# Patient Record
Sex: FEMALE | Race: WHITE | Employment: FULL TIME | URBAN - METROPOLITAN AREA
[De-identification: names, ages, dates, MRNs, and addresses within clinical notes are randomized per-mention and may not be internally consistent; named-entity substitution may affect disease eponyms.]

---

## 2024-08-12 DIAGNOSIS — M25.561 RIGHT KNEE PAIN, UNSPECIFIED CHRONICITY: Primary | ICD-10-CM

## 2024-08-12 NOTE — PROGRESS NOTES
[facility] ORTHOPEDICS AND SPORTS MEDICINE  [address]  [place] [address]  Dept: [phone]  Dept Fax: [phone]          Right knee -[facility]-new patient    Subjective:     Chief Complaint   Patient presents with    Knee Pain     R Knee Pain     HPI:     [facility] claim number: [id number]  Date of injury: 5/8/2024  Diagnosis: S86.911A--knee sprain right                    S76.311A-right hamstring strain                    S83.241A-other tear of the medial meniscus, right knee    Mihaela Orozco presents today for right knee pain. The pain has been present since 5/8/2024.patient had a work-related injury.  She states she was lifting something heavy and had knee pain. She had a sharp pain and then could not put weight on it.  The patient has tried ice, rest, brace, PT, naprosyn with mild improvement. The pain is now described as Achy, Sharp, and Dull. There is  pain on weight bearing. The knee has swelled. There is not painful popping and clicking. The knee has not caught or locked up. The knee has not given out. It is  stiff upon arising from sitting. It is  painful to go up and down stairs and sit for a prolonged time. The patient has not had a cortisone injection. The patient has not tried a lubrication injection. The patient has tried physical therapy for one week. The patient has not had surgery.  The patient has had an MRI.  Patient is working with restrictions of no squatting, no ladders and mostly has a sedentary job.  She has not been going on any of the set ups.  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  She does have a job that is often sedentary but she does have to do set ups and travel happens.  When they are doing sit ups it is a much more physical job.    ROS:   Review of Systems   Constitutional:  Positive for activity change. Negative for appetite change, fatigue and fever.   Respiratory: Negative.  Negative for apnea,

## 2024-08-14 ENCOUNTER — OFFICE VISIT (OUTPATIENT)
Dept: ORTHOPEDIC SURGERY | Age: 44
End: 2024-08-14

## 2024-08-14 VITALS — RESPIRATION RATE: 20 BRPM | WEIGHT: 220 LBS | BODY MASS INDEX: 36.65 KG/M2 | HEIGHT: 65 IN

## 2024-08-14 DIAGNOSIS — S83.241A ACUTE MEDIAL MENISCUS TEAR OF RIGHT KNEE, INITIAL ENCOUNTER: Primary | ICD-10-CM

## 2024-08-14 DIAGNOSIS — M17.11 PRIMARY OSTEOARTHRITIS OF RIGHT KNEE: ICD-10-CM

## 2024-08-14 ASSESSMENT — ENCOUNTER SYMPTOMS
CHEST TIGHTNESS: 0
VOMITING: 0
CONSTIPATION: 0
NAUSEA: 0
COLOR CHANGE: 0
COUGH: 0
APNEA: 0
GASTROINTESTINAL NEGATIVE: 1
SHORTNESS OF BREATH: 0
ABDOMINAL DISTENTION: 0
DIARRHEA: 0
RESPIRATORY NEGATIVE: 1
ABDOMINAL PAIN: 0

## 2024-08-19 ENCOUNTER — TELEPHONE (OUTPATIENT)
Dept: ORTHOPEDIC SURGERY | Age: 44
End: 2024-08-19

## 2024-08-19 NOTE — TELEPHONE ENCOUNTER
Left VM for patient to call back. Received approval for a R Knee Inj (scanned in to media). 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     Please express that I have not received approval for her PT yet. I did call on it to see if it was just an error, but the approval for the injection did come through so we can proceed with that. I will call her when I get the approval for PT.

## 2024-08-20 NOTE — TELEPHONE ENCOUNTER
Spoke with patient. Informed her that I received the approval for PT. Stated she can get that scheduled and start ASAP.     This was scanned in to media.

## 2024-08-26 ENCOUNTER — OFFICE VISIT (OUTPATIENT)
Dept: ORTHOPEDIC SURGERY | Age: 44
End: 2024-08-26

## 2024-08-26 VITALS — RESPIRATION RATE: 18 BRPM | OXYGEN SATURATION: 97 % | BODY MASS INDEX: 36.49 KG/M2 | HEIGHT: 65 IN | WEIGHT: 219 LBS

## 2024-08-26 DIAGNOSIS — M17.11 PRIMARY OSTEOARTHRITIS OF RIGHT KNEE: ICD-10-CM

## 2024-08-26 DIAGNOSIS — S83.241A ACUTE MEDIAL MENISCUS TEAR OF RIGHT KNEE, INITIAL ENCOUNTER: Primary | ICD-10-CM

## 2024-08-26 DIAGNOSIS — S86.911D STRAIN OF RIGHT KNEE AND LEG, SUBSEQUENT ENCOUNTER: ICD-10-CM

## 2024-08-26 RX ORDER — AZELAIC ACID 0.15 G/G
GEL TOPICAL
COMMUNITY
Start: 2024-03-11

## 2024-08-26 NOTE — PROGRESS NOTES
[facility] ORTHOPEDICS AND SPORTS MEDICINE  [address]  [place] [address]  Dept: [phone]  Dept Fax: [phone]          Right knee Visit - Follow up    Subjective:     Chief Complaint   Patient presents with    Knee Pain     Right knee pain- Cortisone- DOI 5/8/24     HPI:     [facility] claim number: [id number]  Date of injury: 5/8/2024  Diagnosis: S86.911A--knee sprain right                    S76.311A-right hamstring strain                    S83.241A-other tear of the medial meniscus, right knee    Mihaela Orozco presents today for Right knee pain.  Patient is here today for cortisone injections into Right knee.  She has not had a cortisone in the past.  She is working with no squatting, no ladders but mostly has a sedentary job.     I have reviewed the CC, and if not present in this note, I have reviewed in the patient's chart.   I agree with the documentation provided by other staff and have reviewed their documentation prior to providing my signature indicating agreement.  Vitals:   Resp 18   Ht 1.651 m (5' 5\")   Wt 99.3 kg (219 lb)   SpO2 97%   BMI 36.44 kg/m²  Body mass index is 36.44 kg/m².    Assessment:     1. Acute medial meniscus tear of right knee, initial encounter    2. Primary osteoarthritis of right knee    3. Strain of right knee and leg, subsequent encounter          Procedure:   Procedure: Yes    Regular Knee Injection    Location: Right Knee  Procedure: I discussed in detail the risks, benefits and complications of the corticosteroid injection which included but are not limited to: infection, skin reactions, hot swollen, and anaphylaxis with the patient. Mihaela Orozco verbalized understanding and they have agreed to have the corticosteroid injection into the right knee. The patient was placed in the Supine position on the exam table. The superior lateral portal was identified and marked with a ball point

## 2024-09-05 ENCOUNTER — HOSPITAL ENCOUNTER (OUTPATIENT)
Dept: PHYSICAL THERAPY | Facility: CLINIC | Age: 44
Setting detail: THERAPIES SERIES
Discharge: HOME | End: 2024-09-05
Payer: COMMERCIAL

## 2024-09-05 PROCEDURE — 97110 THERAPEUTIC EXERCISES: CPT

## 2024-09-05 PROCEDURE — 97161 PT EVAL LOW COMPLEX 20 MIN: CPT

## 2024-09-05 NOTE — CONSULTS
Treatment Plan:    [x] Therapeutic Exercise   56419  [] Iontophoresis: 4 mg/mL Dexamethasone Sodium Phosphate  mAmin  46103   [x] Manual Therapy  66632 [] Aquatic Therapy   68783    [x] Gait Training   33002 [] Ultrasound   64297   [x] Neuromuscular Re-education  61577 [] Electrical Stimulation Unattended  40502   []  Therapeutic Activity  89768 [] Electrical Stimulation Attended  76042   [x] Instruction in HEP  [] Lumbar/Cervical Traction  46775   [x] Vasopneumatic cold with compression  85944  [] Cold/hotpack        Frequency:  2-3 x/week for 20 visits      Today’s Treatment:    Precautions: Standard     Exercise    RLE Meniscus tear, OA knee  Reps/ Time Weight/ Level Comments         Bike             Hamstring stretch   HEP   Calf stretch   HEP   Bridges   HEP   SLR   HEP   Sidelying hip Abd   HEP   Clamshells   HEP         4 way hip band      TKE band      Total Gym Squat      Total Gym heel raise      Step up      Tap down      Balance board                                             Specific Instructions for next treatment: advance HEP     Evaluation Complexity:  History (Personal factors, comorbidities) [x] 0 [] 1-2 [] 3+   Exam (limitations, restrictions) [x] 1-2 [] 3 [] 4+   Clinical presentation (progression) [x] Stable [] Evolving  [] Unstable   Decision Making [x] Low [] Moderate [] High    [x] Low Complexity [] Moderate Complexity [] High Complexity         Treatment Charges:   Mins Units Time   Evaluation       [x]  Low       []  Moderate       []  High   30   1  1086-8097   Ther Exercise   10  1  3486-6622   Manual Therapy          Vasocompression          Neuro Re-ed          Ther Activity           Gait                    Total Treatment time   40   2 5784-1589         TOTAL TREATMENT TIME: 40    Time in:1300   Time Out:1340      Electronically signed by: Alex Luther PT        Physician Signature:________________________________Date:__________________  By signing above or cosigning

## 2024-09-09 ENCOUNTER — HOSPITAL ENCOUNTER (OUTPATIENT)
Dept: PHYSICAL THERAPY | Facility: CLINIC | Age: 44
Setting detail: THERAPIES SERIES
Discharge: HOME | End: 2024-09-09
Payer: COMMERCIAL

## 2024-09-09 PROCEDURE — 97110 THERAPEUTIC EXERCISES: CPT

## 2024-09-13 ENCOUNTER — HOSPITAL ENCOUNTER (OUTPATIENT)
Dept: PHYSICAL THERAPY | Facility: CLINIC | Age: 44
Setting detail: THERAPIES SERIES
Discharge: HOME | End: 2024-09-13
Payer: COMMERCIAL

## 2024-09-13 PROCEDURE — 97110 THERAPEUTIC EXERCISES: CPT

## 2024-09-16 ENCOUNTER — HOSPITAL ENCOUNTER (OUTPATIENT)
Dept: PHYSICAL THERAPY | Facility: CLINIC | Age: 44
Setting detail: THERAPIES SERIES
Discharge: HOME | End: 2024-09-16
Payer: COMMERCIAL

## 2024-09-16 PROCEDURE — 97110 THERAPEUTIC EXERCISES: CPT

## 2024-09-20 ENCOUNTER — HOSPITAL ENCOUNTER (OUTPATIENT)
Dept: PHYSICAL THERAPY | Facility: CLINIC | Age: 44
Setting detail: THERAPIES SERIES
Discharge: HOME | End: 2024-09-20
Payer: COMMERCIAL

## 2024-09-20 PROCEDURE — 97110 THERAPEUTIC EXERCISES: CPT

## 2024-09-23 ENCOUNTER — APPOINTMENT (OUTPATIENT)
Dept: PHYSICAL THERAPY | Facility: CLINIC | Age: 44
End: 2024-09-23
Payer: COMMERCIAL

## 2024-09-27 ENCOUNTER — HOSPITAL ENCOUNTER (OUTPATIENT)
Dept: PHYSICAL THERAPY | Facility: CLINIC | Age: 44
Setting detail: THERAPIES SERIES
Discharge: HOME | End: 2024-09-27
Payer: COMMERCIAL

## 2024-09-27 PROCEDURE — 97110 THERAPEUTIC EXERCISES: CPT

## 2024-10-02 NOTE — PROGRESS NOTES
[facility] ORTHOPEDICS AND SPORTS MEDICINE  [address]  [place] [address]  Dept: [phone]  Dept Fax: [phone]        Ambulatory Follow Up      Subjective:   Mihaela Orozco is a 44 y.o. year old female who presents to our office today for routine followup regarding her   1. Acute medial meniscus tear of right knee, initial encounter    2. Primary osteoarthritis of right knee    .    Chief Complaint   Patient presents with    Knee Pain     [facility]-Right Knee Pain  Date of injury: 5/8/2024       [facility] claim number: [id number]  Date of injury: 5/8/2024  Diagnosis: S86.911A--knee sprain right                    S76.311A-right hamstring strain                    S83.241A-other tear of the medial meniscus, right knee    HPI Mihaela Orozco  is a 44 y.o.  female who presents today in follow for right knee pain.  The patient was last seen on 8/26/2024 and underwent treatment in the form of cortisone injection into the right knee and going to physical therapy.   Patient has been attending physical therapy.  Patient is working with no squatting, no ladders but mostly has a sedentary job.  Will get approval for a medial  brace.  The patient notes 80% improvement with the previous treatment.   She is able to do everything including stairs and squatting now.     Review of Systems   Constitutional:  Positive for activity change. Negative for appetite change, fatigue and fever.   Respiratory: Negative.  Negative for apnea, cough, chest tightness and shortness of breath.    Cardiovascular: Negative.  Negative for chest pain, palpitations and leg swelling.   Gastrointestinal: Negative.  Negative for abdominal distention, abdominal pain, constipation, diarrhea, nausea and vomiting.   Genitourinary: Negative.  Negative for difficulty urinating, dysuria and hematuria.   Musculoskeletal:  Positive for arthralgias. Negative for gait problem, joint

## 2024-10-03 ENCOUNTER — OFFICE VISIT (OUTPATIENT)
Dept: ORTHOPEDIC SURGERY | Age: 44
End: 2024-10-03
Payer: COMMERCIAL

## 2024-10-03 VITALS — RESPIRATION RATE: 16 BRPM | HEIGHT: 65 IN | OXYGEN SATURATION: 98 % | WEIGHT: 240 LBS | BODY MASS INDEX: 39.99 KG/M2

## 2024-10-03 DIAGNOSIS — S83.241A ACUTE MEDIAL MENISCUS TEAR OF RIGHT KNEE, INITIAL ENCOUNTER: Primary | ICD-10-CM

## 2024-10-03 DIAGNOSIS — M17.11 PRIMARY OSTEOARTHRITIS OF RIGHT KNEE: ICD-10-CM

## 2024-10-03 PROCEDURE — 99213 OFFICE O/P EST LOW 20 MIN: CPT | Performed by: PHYSICIAN ASSISTANT

## 2024-10-03 RX ORDER — DESONIDE 0.5 MG/ML
LOTION TOPICAL
COMMUNITY
Start: 2024-07-27

## 2024-10-03 ASSESSMENT — ENCOUNTER SYMPTOMS
ABDOMINAL PAIN: 0
SHORTNESS OF BREATH: 0
VOMITING: 0
NAUSEA: 0
COLOR CHANGE: 0
ABDOMINAL DISTENTION: 0
CHEST TIGHTNESS: 0
RESPIRATORY NEGATIVE: 1
DIARRHEA: 0
APNEA: 0
CONSTIPATION: 0
GASTROINTESTINAL NEGATIVE: 1
COUGH: 0

## 2024-10-03 NOTE — PATIENT INSTRUCTIONS
PATIENTIQ:  PatientIQ helps [facility] stay in touch with you to know how you're feeling, and provides education and care instructions to you at various time points.   Your answers help your care team track your progress to provide the best care possible. PatientIQ will contact you pre-op and post-op via email or text with:  Educational Videos and Care Instructions  Questionnaires About How You're Feeling    Your participation provides you valuable education and helps [facility] continue to provide quality care to all patients. Thank you

## 2024-10-04 DIAGNOSIS — M25.562 LEFT KNEE PAIN, UNSPECIFIED CHRONICITY: Primary | ICD-10-CM

## 2024-10-08 NOTE — PROGRESS NOTES
new balance stability shoes. The patient has opted for a cortisone injection into the left knee to help reduce inflammation and pain. The injection site should never get red, hot, or swollen and if it does the patient will contact our office right away. The patient may experience a increase in soreness the first 24-48 hours due to a cortisone flair and can take anti-inflammatories for a short period of time to reduce that soreness. The patient should not submerge the injection site in water for a minimum of 24 hours to avoid infection. This means no lakes, pools, ponds, or hot tubs for 24 hours. If the patient is diabetic the injection may increase their blood sugar for up to one week. The patient can do this cortisone injection once every 4 months as needed. If the injections stop working and do not give the patient relief the patient should consider surgical interventions to produce long term relief.  Hopefully the one-time shot will settle things down and we will not have to proceed any further.  The patient will follow-up with me for her left knee as needed.  She will call if she has any questions or concerns.  The patient will call the office immediately with any problems.      Orders Placed This Encounter   Medications    lidocaine 1 % injection 2 mL    methylPREDNISolone acetate (DEPO-MEDROL) injection 80 mg       No orders of the defined types were placed in this encounter.        This note is created with the assistance of a speech recognition program.  While intending to generate a document that actually reflects the content of the visit, the document can still have some errors including those of syntax and sound a like substitutions which may escape proof reading.  In such instances, actual meaning can be extrapolated by contextual diversion    Electronically signed by Angelica Roberto PA-C, on 10/9/2024 at 9:26 AM

## 2024-10-09 ENCOUNTER — OFFICE VISIT (OUTPATIENT)
Dept: ORTHOPEDIC SURGERY | Age: 44
End: 2024-10-09
Payer: COMMERCIAL

## 2024-10-09 VITALS — OXYGEN SATURATION: 100 % | BODY MASS INDEX: 36.49 KG/M2 | WEIGHT: 219 LBS | HEIGHT: 65 IN | RESPIRATION RATE: 16 BRPM

## 2024-10-09 DIAGNOSIS — M22.2X2 PATELLOFEMORAL PAIN SYNDROME OF LEFT KNEE: Primary | ICD-10-CM

## 2024-10-09 PROCEDURE — 20611 DRAIN/INJ JOINT/BURSA W/US: CPT | Performed by: PHYSICIAN ASSISTANT

## 2024-10-09 PROCEDURE — 99214 OFFICE O/P EST MOD 30 MIN: CPT | Performed by: PHYSICIAN ASSISTANT

## 2024-10-09 RX ORDER — METHYLPREDNISOLONE ACETATE 80 MG/ML
80 INJECTION, SUSPENSION INTRA-ARTICULAR; INTRALESIONAL; INTRAMUSCULAR; SOFT TISSUE ONCE
Status: COMPLETED | OUTPATIENT
Start: 2024-10-09 | End: 2024-10-09

## 2024-10-09 RX ORDER — LIDOCAINE HYDROCHLORIDE 10 MG/ML
2 INJECTION, SOLUTION INFILTRATION; PERINEURAL ONCE
Status: COMPLETED | OUTPATIENT
Start: 2024-10-09 | End: 2024-10-09

## 2024-10-09 RX ADMIN — LIDOCAINE HYDROCHLORIDE 2 ML: 10 INJECTION, SOLUTION INFILTRATION; PERINEURAL at 10:16

## 2024-10-09 RX ADMIN — METHYLPREDNISOLONE ACETATE 80 MG: 80 INJECTION, SUSPENSION INTRA-ARTICULAR; INTRALESIONAL; INTRAMUSCULAR; SOFT TISSUE at 10:17

## 2024-10-09 ASSESSMENT — ENCOUNTER SYMPTOMS
ABDOMINAL PAIN: 0
VOMITING: 0
DIARRHEA: 0
SHORTNESS OF BREATH: 0
ABDOMINAL DISTENTION: 0
NAUSEA: 0
RESPIRATORY NEGATIVE: 1
APNEA: 0
GASTROINTESTINAL NEGATIVE: 1
CHEST TIGHTNESS: 0
CONSTIPATION: 0
COUGH: 0
COLOR CHANGE: 0

## 2024-10-09 NOTE — PATIENT INSTRUCTIONS
PATIENTIQ:  PatientIQ helps [facility] stay in touch with you to know how you're feeling, and provides education and care instructions to you at various time points.   Your answers help your care team track your progress to provide the best care possible. PatientIQ will contact you pre-op and post-op via email or text with:  Educational Videos and Care Instructions  Questionnaires About How You're Feeling    Your participation provides you valuable education and helps [facility] continue to provide quality care to all patients. Thank you     CORTISONE INJECTION CARE    The injection site should never get red, hot, or swollen and if it does the patient will contact our office right away. The patient may experience a increase in soreness the first 24-48 hours due to a cortisone flair and can take anti-inflammatories for a short period of time to reduce that soreness. The patient should not submerge the injection site in water for a minimum of 24 hours to avoid infection. This means no lakes, pools, ponds, or hot tubs for 24 hours. If the patient is diabetic the injection may increase their blood sugar for up to one week. The patient can do this cortisone injection once every 4 months as needed.

## 2025-01-29 ENCOUNTER — OFFICE VISIT (OUTPATIENT)
Dept: ORTHOPEDIC SURGERY | Age: 45
End: 2025-01-29
Payer: COMMERCIAL

## 2025-01-29 VITALS — BODY MASS INDEX: 38.15 KG/M2 | RESPIRATION RATE: 16 BRPM | OXYGEN SATURATION: 100 % | WEIGHT: 229 LBS | HEIGHT: 65 IN

## 2025-01-29 DIAGNOSIS — M22.41 CHONDROMALACIA OF PATELLOFEMORAL JOINT, RIGHT: ICD-10-CM

## 2025-01-29 DIAGNOSIS — M25.461 KNEE EFFUSION, RIGHT: ICD-10-CM

## 2025-01-29 DIAGNOSIS — M17.11 PRIMARY OSTEOARTHRITIS OF RIGHT KNEE: Primary | ICD-10-CM

## 2025-01-29 PROCEDURE — 20611 DRAIN/INJ JOINT/BURSA W/US: CPT | Performed by: PHYSICIAN ASSISTANT

## 2025-01-29 PROCEDURE — 99213 OFFICE O/P EST LOW 20 MIN: CPT | Performed by: PHYSICIAN ASSISTANT

## 2025-01-29 RX ORDER — LIDOCAINE HYDROCHLORIDE 10 MG/ML
2 INJECTION, SOLUTION INFILTRATION; PERINEURAL ONCE
Status: COMPLETED | OUTPATIENT
Start: 2025-01-29 | End: 2025-01-29

## 2025-01-29 RX ORDER — METHYLPREDNISOLONE ACETATE 80 MG/ML
80 INJECTION, SUSPENSION INTRA-ARTICULAR; INTRALESIONAL; INTRAMUSCULAR; SOFT TISSUE ONCE
Status: COMPLETED | OUTPATIENT
Start: 2025-01-29 | End: 2025-01-29

## 2025-01-29 RX ADMIN — LIDOCAINE HYDROCHLORIDE 2 ML: 10 INJECTION, SOLUTION INFILTRATION; PERINEURAL at 11:37

## 2025-01-29 RX ADMIN — METHYLPREDNISOLONE ACETATE 80 MG: 80 INJECTION, SUSPENSION INTRA-ARTICULAR; INTRALESIONAL; INTRAMUSCULAR; SOFT TISSUE at 11:38

## 2025-01-29 ASSESSMENT — ENCOUNTER SYMPTOMS
APNEA: 0
DIARRHEA: 0
COLOR CHANGE: 0
NAUSEA: 0
VOMITING: 0
CHEST TIGHTNESS: 0
SHORTNESS OF BREATH: 0
GASTROINTESTINAL NEGATIVE: 1
COUGH: 0
ABDOMINAL DISTENTION: 0
RESPIRATORY NEGATIVE: 1
ABDOMINAL PAIN: 0
CONSTIPATION: 0

## 2025-01-29 NOTE — PROGRESS NOTES
[facility] PHYSICIANS [facility] ORTHOPEDICS AND SPORTS MEDICINE  [address]  [place] [address]  Dept: [phone]  Dept Fax: [phone]        Ambulatory Follow Up      Subjective:   Mihaela Orozco is a 44 y.o. year old female who presents to our office today for routine followup regarding her   1. Primary osteoarthritis of right knee    2. Chondromalacia of patellofemoral joint, right    3. Knee effusion, right    .    Chief Complaint   Patient presents with    Knee Pain     Right (LI: 10/9/24) - This was workers comp - claim is currently closed. If you feel this is all due to OA, then we cannot re-open her claim, if you feel there is a tear we can request to re-open.          History of Present Illness  The patient is a 44-year-old female who presents for a follow-up of her right knee. This was a previous worker's compensation claim, and her MCL claim was closed. She was last seen on 10/03/2024. The patient did have an injection on 08/26/2024.    She reports that her right knee has been feeling similar to how it did before a significant episode in May 2024, which prompted her to seek medical attention. The onset of her current symptoms began approximately 5 weeks ago, characterized by fatigue and achy toothache pain. Her knee discomfort escalated in December 2024. Despite her symptoms, she has not taken any over-the-counter pain relievers such as Tylenol or ibuprofen. She is not diabetic. She has expressed interest in surgical intervention for her knee. She has been engaging in physical therapy exercises and using dumbbells, while being cautious with her knee. She has been utilizing an  brace when standing for more than 2 hours, which provides temporary relief but eventually causes irritation.      Review of Systems   Constitutional:  Positive for activity change. Negative for appetite change, fatigue and fever.   Respiratory: Negative.

## 2025-03-05 ENCOUNTER — TELEPHONE (OUTPATIENT)
Dept: ORTHOPEDIC SURGERY | Age: 45
End: 2025-03-05

## 2025-03-05 NOTE — TELEPHONE ENCOUNTER
Patient checking on status of approval from insurance to receive the lubrication injection in rt knee.     Please call patient back @[phone]

## 2025-03-28 NOTE — TELEPHONE ENCOUNTER
Called pts insurance to check on coverage for Durolane.  The medication is covered under the patients policy and payable through [employer].  No prior auth required.      Please notify patient and schedule accordingly with [place]

## 2025-04-10 NOTE — PROGRESS NOTES
[facility] PHYSICIANS [facility] ORTHOPEDICS AND SPORTS MEDICINE  [address]  [place] [facility] [address]  Dept: [phone]  Dept Fax: [phone]          Bilateral knee Visit - Follow up    Subjective:     Chief Complaint   Patient presents with    Knee Pain     Bilateral knee pain-Li 8/26/24     HPI:     Mihaela Orozco presents today for Rightknee pain.  Patient is here today for lubrication injections into Right knee.  She had her last cortisone injection was on 1/29/2025.  She has not had a lubrication injection on previously.  History of Present Illness  The patient presents for evaluation of bilateral knee pain.    Mild pain in both knees is reported today, with discomfort predominantly localized to the anterior and medial aspects of the knees. Despite the pain, an active lifestyle is maintained, incorporating regular walking and exercise into the routine. No prior lubrication injections have been administered in either knee. Activities such as deep squatting, kneeling, and lunging are avoided as they exacerbate the symptoms. Cortisone injections were received in the left knee in 10/2024 and in the right knee on 01/29/2025.    SOCIAL HISTORY  Occupations: Works from home  Exercise: Walking and working out a few times a week      Review of Systems   Constitutional:  Positive for activity change. Negative for appetite change, fatigue and fever.   Respiratory: Negative.  Negative for apnea, cough, chest tightness and shortness of breath.    Cardiovascular: Negative.  Negative for chest pain, palpitations and leg swelling.   Gastrointestinal: Negative.  Negative for abdominal distention, abdominal pain, constipation, diarrhea, nausea and vomiting.   Genitourinary: Negative.  Negative for difficulty urinating, dysuria and hematuria.   Musculoskeletal:  Positive for arthralgias, gait problem and joint swelling. Negative for myalgias.   Skin: Negative.

## 2025-04-14 ENCOUNTER — OFFICE VISIT (OUTPATIENT)
Dept: ORTHOPEDIC SURGERY | Age: 45
End: 2025-04-14

## 2025-04-14 VITALS — RESPIRATION RATE: 17 BRPM | WEIGHT: 228 LBS | OXYGEN SATURATION: 97 % | HEIGHT: 65 IN | BODY MASS INDEX: 37.99 KG/M2

## 2025-04-14 DIAGNOSIS — M22.41 CHONDROMALACIA OF PATELLOFEMORAL JOINT, RIGHT: Primary | ICD-10-CM

## 2025-04-14 DIAGNOSIS — M25.461 EFFUSION OF RIGHT KNEE: ICD-10-CM

## 2025-04-14 DIAGNOSIS — M22.42 CHONDROMALACIA OF PATELLOFEMORAL JOINT, LEFT: ICD-10-CM

## 2025-04-14 ASSESSMENT — ENCOUNTER SYMPTOMS
VOMITING: 0
APNEA: 0
DIARRHEA: 0
NAUSEA: 0
GASTROINTESTINAL NEGATIVE: 1
CONSTIPATION: 0
ABDOMINAL DISTENTION: 0
CHEST TIGHTNESS: 0
COUGH: 0
SHORTNESS OF BREATH: 0
ABDOMINAL PAIN: 0
COLOR CHANGE: 0
RESPIRATORY NEGATIVE: 1

## 2025-04-14 NOTE — PATIENT INSTRUCTIONS
PATIENTIQ:  PatientIQ helps [facility] stay in touch with you to know how you're feeling, and provides education and care instructions to you at various time points.   Your answers help your care team track your progress to provide the best care possible. PatientIQ will contact you pre-op and post-op via email or text with:  Educational Videos and Care Instructions  Questionnaires About How You're Feeling    Your participation provides you valuable education and helps [facility] continue to provide quality care to all patients. Thank you